# Patient Record
Sex: MALE | Race: WHITE | NOT HISPANIC OR LATINO | URBAN - METROPOLITAN AREA
[De-identification: names, ages, dates, MRNs, and addresses within clinical notes are randomized per-mention and may not be internally consistent; named-entity substitution may affect disease eponyms.]

---

## 2017-06-19 ENCOUNTER — OUTPATIENT (OUTPATIENT)
Dept: OUTPATIENT SERVICES | Facility: HOSPITAL | Age: 52
LOS: 1 days | Discharge: HOME | End: 2017-06-19

## 2017-06-19 DIAGNOSIS — I48.91 UNSPECIFIED ATRIAL FIBRILLATION: ICD-10-CM

## 2017-06-19 DIAGNOSIS — E87.0 HYPEROSMOLALITY AND HYPERNATREMIA: ICD-10-CM

## 2017-06-28 DIAGNOSIS — R10.9 UNSPECIFIED ABDOMINAL PAIN: ICD-10-CM

## 2017-06-28 PROBLEM — Z00.00 ENCOUNTER FOR PREVENTIVE HEALTH EXAMINATION: Status: ACTIVE | Noted: 2017-06-28

## 2017-07-10 ENCOUNTER — APPOINTMENT (OUTPATIENT)
Dept: SURGERY | Facility: CLINIC | Age: 52
End: 2017-07-10

## 2017-07-10 VITALS
HEART RATE: 76 BPM | WEIGHT: 226 LBS | HEIGHT: 72 IN | DIASTOLIC BLOOD PRESSURE: 78 MMHG | BODY MASS INDEX: 30.61 KG/M2 | TEMPERATURE: 98.4 F | SYSTOLIC BLOOD PRESSURE: 130 MMHG

## 2017-07-10 DIAGNOSIS — Z86.79 PERSONAL HISTORY OF OTHER DISEASES OF THE CIRCULATORY SYSTEM: ICD-10-CM

## 2017-07-10 RX ORDER — RIVAROXABAN 20 MG/1
20 TABLET, FILM COATED ORAL
Refills: 0 | Status: ACTIVE | COMMUNITY
Start: 2017-07-10

## 2017-07-10 RX ORDER — DRONEDARONE 400 MG/1
400 TABLET, FILM COATED ORAL TWICE DAILY
Refills: 0 | Status: ACTIVE | COMMUNITY
Start: 2017-07-10

## 2017-07-10 RX ORDER — OMEPRAZOLE MAGNESIUM 20 MG/1
20 TABLET, DELAYED RELEASE ORAL
Refills: 0 | Status: ACTIVE | COMMUNITY
Start: 2017-07-10

## 2017-07-10 RX ORDER — METOPROLOL TARTRATE 25 MG/1
25 TABLET, FILM COATED ORAL DAILY
Refills: 0 | Status: ACTIVE | COMMUNITY
Start: 2017-07-10

## 2017-07-10 RX ORDER — EZETIMIBE AND SIMVASTATIN 10; 10 MG/1; MG/1
10-10 TABLET ORAL DAILY
Refills: 0 | Status: ACTIVE | COMMUNITY
Start: 2017-07-10

## 2017-07-10 RX ORDER — SERTRALINE HYDROCHLORIDE 50 MG/1
50 TABLET, FILM COATED ORAL DAILY
Refills: 0 | Status: ACTIVE | COMMUNITY
Start: 2017-07-10

## 2017-07-10 RX ORDER — AMLODIPINE BESYLATE 5 MG/1
5 TABLET ORAL DAILY
Refills: 0 | Status: ACTIVE | COMMUNITY
Start: 2017-07-10

## 2017-07-14 PROBLEM — Z86.79 HISTORY OF ATRIAL FIBRILLATION: Status: RESOLVED | Noted: 2017-07-14 | Resolved: 2017-07-14

## 2017-07-14 PROBLEM — Z86.79 HISTORY OF HYPERTENSION: Status: RESOLVED | Noted: 2017-07-14 | Resolved: 2017-07-14

## 2017-10-05 ENCOUNTER — TRANSCRIPTION ENCOUNTER (OUTPATIENT)
Age: 52
End: 2017-10-05

## 2018-07-09 ENCOUNTER — APPOINTMENT (OUTPATIENT)
Dept: SURGERY | Facility: CLINIC | Age: 53
End: 2018-07-09
Payer: COMMERCIAL

## 2018-07-09 ENCOUNTER — OUTPATIENT (OUTPATIENT)
Dept: OUTPATIENT SERVICES | Facility: HOSPITAL | Age: 53
LOS: 1 days | Discharge: HOME | End: 2018-07-09

## 2018-07-09 VITALS
DIASTOLIC BLOOD PRESSURE: 92 MMHG | HEART RATE: 73 BPM | HEIGHT: 72 IN | SYSTOLIC BLOOD PRESSURE: 140 MMHG | BODY MASS INDEX: 31.15 KG/M2 | WEIGHT: 230 LBS | TEMPERATURE: 99 F

## 2018-07-09 DIAGNOSIS — D49.0 NEOPLASM OF UNSPECIFIED BEHAVIOR OF DIGESTIVE SYSTEM: ICD-10-CM

## 2018-07-09 PROCEDURE — 99213 OFFICE O/P EST LOW 20 MIN: CPT

## 2018-07-10 LAB
ALBUMIN SERPL ELPH-MCNC: 5 G/DL
ALP BLD-CCNC: 101 U/L
ALT SERPL-CCNC: 25 U/L
ANION GAP SERPL CALC-SCNC: 17 MMOL/L
AST SERPL-CCNC: 18 U/L
BILIRUB SERPL-MCNC: 0.4 MG/DL
BUN SERPL-MCNC: 9 MG/DL
CALCIUM SERPL-MCNC: 9.8 MG/DL
CANCER AG19-9 SERPL-ACNC: 34 U/ML
CEA SERPL-MCNC: 2.5 NG/ML
CHLORIDE SERPL-SCNC: 101 MMOL/L
CO2 SERPL-SCNC: 26 MMOL/L
CREAT SERPL-MCNC: 0.9 MG/DL
GLUCOSE SERPL-MCNC: 93 MG/DL
POTASSIUM SERPL-SCNC: 4.3 MMOL/L
PROT SERPL-MCNC: 7.8 G/DL
SODIUM SERPL-SCNC: 144 MMOL/L

## 2018-08-03 ENCOUNTER — OUTPATIENT (OUTPATIENT)
Dept: OUTPATIENT SERVICES | Facility: HOSPITAL | Age: 53
LOS: 1 days | Discharge: HOME | End: 2018-08-03

## 2018-08-03 DIAGNOSIS — D49.0 NEOPLASM OF UNSPECIFIED BEHAVIOR OF DIGESTIVE SYSTEM: ICD-10-CM

## 2019-09-11 ENCOUNTER — APPOINTMENT (OUTPATIENT)
Dept: SURGERY | Facility: CLINIC | Age: 54
End: 2019-09-11
Payer: COMMERCIAL

## 2019-09-11 ENCOUNTER — OUTPATIENT (OUTPATIENT)
Dept: OUTPATIENT SERVICES | Facility: HOSPITAL | Age: 54
LOS: 1 days | Discharge: HOME | End: 2019-09-11

## 2019-09-11 VITALS
HEART RATE: 77 BPM | WEIGHT: 228 LBS | HEIGHT: 72 IN | BODY MASS INDEX: 30.88 KG/M2 | DIASTOLIC BLOOD PRESSURE: 80 MMHG | SYSTOLIC BLOOD PRESSURE: 128 MMHG | TEMPERATURE: 98.4 F

## 2019-09-11 DIAGNOSIS — D49.0 NEOPLASM OF UNSPECIFIED BEHAVIOR OF DIGESTIVE SYSTEM: ICD-10-CM

## 2019-09-11 LAB
ALBUMIN SERPL ELPH-MCNC: 4.8 G/DL
ALP BLD-CCNC: 105 U/L
ALT SERPL-CCNC: 29 U/L
ANION GAP SERPL CALC-SCNC: 13 MMOL/L
AST SERPL-CCNC: 19 U/L
BILIRUB SERPL-MCNC: 0.3 MG/DL
BUN SERPL-MCNC: 14 MG/DL
CALCIUM SERPL-MCNC: 9.8 MG/DL
CHLORIDE SERPL-SCNC: 102 MMOL/L
CO2 SERPL-SCNC: 28 MMOL/L
CREAT SERPL-MCNC: 0.9 MG/DL
GLUCOSE SERPL-MCNC: 116 MG/DL
POTASSIUM SERPL-SCNC: 4.7 MMOL/L
PROT SERPL-MCNC: 7.4 G/DL
SODIUM SERPL-SCNC: 143 MMOL/L

## 2019-09-11 PROCEDURE — 99214 OFFICE O/P EST MOD 30 MIN: CPT

## 2019-09-15 LAB — CANCER AG19-9 SERPL-ACNC: 21 U/ML

## 2019-09-22 NOTE — HISTORY OF PRESENT ILLNESS
[de-identified] : 53 yo male s/p lap. cholecystectomy in August 2018. IPMN in body. 5 mm in size each.  Stable on MRI from June 2017. PD is normal, no solid component. Asymptomatic and denies any GI symptoms.

## 2019-09-22 NOTE — ASSESSMENT
[FreeTextEntry1] : 55 yo male s/p lap. cholecystectomy in August 2018. IPMN in body. 5 mm in size each.  Stable on MRI from June 2017. PD is normal, no solid component. Asymptomatic and denies any GI symptoms. \par \par Assessment and Plan:\par \par Asymptomatic IPMN.\par \par CEA, , CMP and MRI ordered.\par MRI with and without contrast to assess IPMN.\par \par Followup will depend on new imaging.\par \par All the questions were answered to their satisfaction and I encourage the patient to call my office at anytime if he had any questions. Plan of care fully discussed with the patient.

## 2019-10-08 ENCOUNTER — CHART COPY (OUTPATIENT)
Age: 54
End: 2019-10-08

## 2019-10-21 ENCOUNTER — FORM ENCOUNTER (OUTPATIENT)
Age: 54
End: 2019-10-21

## 2019-10-22 ENCOUNTER — OUTPATIENT (OUTPATIENT)
Dept: OUTPATIENT SERVICES | Facility: HOSPITAL | Age: 54
LOS: 1 days | Discharge: HOME | End: 2019-10-22
Payer: COMMERCIAL

## 2019-10-22 DIAGNOSIS — D49.0 NEOPLASM OF UNSPECIFIED BEHAVIOR OF DIGESTIVE SYSTEM: ICD-10-CM

## 2019-10-22 PROCEDURE — 74183 MRI ABD W/O CNTR FLWD CNTR: CPT | Mod: 26

## 2021-02-23 ENCOUNTER — NON-APPOINTMENT (OUTPATIENT)
Age: 56
End: 2021-02-23

## 2021-07-07 ENCOUNTER — APPOINTMENT (OUTPATIENT)
Dept: SURGERY | Facility: CLINIC | Age: 56
End: 2021-07-07
Payer: COMMERCIAL

## 2021-07-07 VITALS
WEIGHT: 232 LBS | HEART RATE: 86 BPM | TEMPERATURE: 97.1 F | DIASTOLIC BLOOD PRESSURE: 83 MMHG | HEIGHT: 72 IN | BODY MASS INDEX: 31.42 KG/M2 | SYSTOLIC BLOOD PRESSURE: 127 MMHG

## 2021-07-07 DIAGNOSIS — D49.0 NEOPLASM OF UNSPECIFIED BEHAVIOR OF DIGESTIVE SYSTEM: ICD-10-CM

## 2021-07-07 PROCEDURE — 99214 OFFICE O/P EST MOD 30 MIN: CPT

## 2021-07-07 NOTE — HISTORY OF PRESENT ILLNESS
[de-identified] : Marco A is a 56 year old man who has a subcentimeter IPMN of the pancreas since 2016 \par MRI done in 2016,17,18,19\par Tumor markers negative twice\par does not smoke\par has not lost weight\par does not have new onset DM\par Does not have new GI Symptoms\par no family history fo cancer other than mother with bladdder cancer at 80s\par \par \par Dr. patton "53 yo male s/p lap. cholecystectomy in August 2018. IPMN in body. 5 mm in size each.  Stable on MRI from June 2017. PD is normal, no solid component. Asymptomatic and denies any GI symptoms. "

## 2021-07-07 NOTE — PHYSICAL EXAM
[JVD] : no jugular venous distention  [Respiratory Effort] : normal respiratory effort [de-identified] : Normal [de-identified] : Normal [de-identified] : Normal [Normal] : supple, no neck mass and thyroid not enlarged [Normal] : oriented to person, place and time, with appropriate affect

## 2021-07-07 NOTE — ASSESSMENT
[FreeTextEntry1] : Marco A is a 56 year old man who has a subcentimeter IPMN of the pancreas since 2016 \par MRI done in 2016,17,18,19\par Tumor markers negative twice\par does not smoke\par has not lost weight\par does not have new onset DM\par Does not have new GI Symptoms\par no family history fo cancer other than mother with bladdder cancer at 80s\par \par \par Dr. patton "55 yo male s/p lap. cholecystectomy in August 2018. IPMN in body. 5 mm in size each.  Stable on MRI from June 2017. PD is normal, no solid component. Asymptomatic and denies any GI symptoms. "\par \par Assessment and Plan:\par \par Asymptomatic IPMN.\par \par CEA, , CMP and MRI ordered.\par MRI with and without contrast to assess IPMN.\par \par We explained in great detail the pathophysiology of the disease process. We micah diagrams and discussed the workup for diagnosis and management.\par The various options were explained to the patient. The Risk , benefit and alternatives were discussed. We discussed recovery and possible complications.\par The Post operative care was explained to the patient. He was counselled on diet , exercise and wound care.\par We discussed the pathology and surgery with him.\par \par We discussed the importance of close follow up. \par We informed that he needs to follow up in 2-3 weeks. .\par We also informed that he can call us if anything changes or has any questions.\par

## 2021-08-11 ENCOUNTER — APPOINTMENT (OUTPATIENT)
Dept: SURGERY | Facility: CLINIC | Age: 56
End: 2021-08-11